# Patient Record
Sex: FEMALE | Race: OTHER | NOT HISPANIC OR LATINO | URBAN - METROPOLITAN AREA
[De-identification: names, ages, dates, MRNs, and addresses within clinical notes are randomized per-mention and may not be internally consistent; named-entity substitution may affect disease eponyms.]

---

## 2017-02-17 ENCOUNTER — INPATIENT (INPATIENT)
Facility: HOSPITAL | Age: 57
LOS: 1 days | Discharge: HOME | End: 2017-02-19
Attending: INTERNAL MEDICINE | Admitting: INTERNAL MEDICINE
Payer: COMMERCIAL

## 2017-02-17 VITALS
RESPIRATION RATE: 16 BRPM | HEART RATE: 111 BPM | HEIGHT: 59 IN | WEIGHT: 145.95 LBS | DIASTOLIC BLOOD PRESSURE: 109 MMHG | TEMPERATURE: 99 F | SYSTOLIC BLOOD PRESSURE: 171 MMHG

## 2017-02-17 DIAGNOSIS — I24.9 ACUTE ISCHEMIC HEART DISEASE, UNSPECIFIED: ICD-10-CM

## 2017-02-17 DIAGNOSIS — R07.9 CHEST PAIN, UNSPECIFIED: ICD-10-CM

## 2017-02-17 LAB
ALBUMIN SERPL ELPH-MCNC: 3.9 G/DL — SIGNIFICANT CHANGE UP (ref 3.3–5)
ALP SERPL-CCNC: 81 U/L — SIGNIFICANT CHANGE UP (ref 40–120)
ALT FLD-CCNC: 20 U/L — SIGNIFICANT CHANGE UP (ref 4–33)
APPEARANCE UR: CLEAR — SIGNIFICANT CHANGE UP
APTT BLD: 28.2 SEC — SIGNIFICANT CHANGE UP (ref 27.5–37.4)
AST SERPL-CCNC: 23 U/L — SIGNIFICANT CHANGE UP (ref 4–32)
BACTERIA # UR AUTO: SIGNIFICANT CHANGE UP
BASOPHILS # BLD AUTO: 0.03 K/UL — SIGNIFICANT CHANGE UP (ref 0–0.2)
BASOPHILS NFR BLD AUTO: 0.2 % — SIGNIFICANT CHANGE UP (ref 0–2)
BILIRUB SERPL-MCNC: 0.3 MG/DL — SIGNIFICANT CHANGE UP (ref 0.2–1.2)
BILIRUB UR-MCNC: NEGATIVE — SIGNIFICANT CHANGE UP
BLOOD UR QL VISUAL: NEGATIVE — SIGNIFICANT CHANGE UP
BUN SERPL-MCNC: 11 MG/DL — SIGNIFICANT CHANGE UP (ref 7–23)
CALCIUM SERPL-MCNC: 9 MG/DL — SIGNIFICANT CHANGE UP (ref 8.4–10.5)
CHLORIDE SERPL-SCNC: 106 MMOL/L — SIGNIFICANT CHANGE UP (ref 98–107)
CK SERPL-CCNC: 102 U/L — SIGNIFICANT CHANGE UP (ref 25–170)
CK SERPL-CCNC: 114 U/L — SIGNIFICANT CHANGE UP (ref 25–170)
CO2 SERPL-SCNC: 24 MMOL/L — SIGNIFICANT CHANGE UP (ref 22–31)
COLOR SPEC: SIGNIFICANT CHANGE UP
CREAT SERPL-MCNC: 0.65 MG/DL — SIGNIFICANT CHANGE UP (ref 0.5–1.3)
EOSINOPHIL # BLD AUTO: 0.12 K/UL — SIGNIFICANT CHANGE UP (ref 0–0.5)
EOSINOPHIL NFR BLD AUTO: 1 % — SIGNIFICANT CHANGE UP (ref 0–6)
GLUCOSE SERPL-MCNC: 121 MG/DL — HIGH (ref 70–99)
GLUCOSE UR-MCNC: NEGATIVE — SIGNIFICANT CHANGE UP
HCT VFR BLD CALC: 35.9 % — SIGNIFICANT CHANGE UP (ref 34.5–45)
HGB BLD-MCNC: 11.4 G/DL — LOW (ref 11.5–15.5)
IMM GRANULOCYTES NFR BLD AUTO: 0.4 % — SIGNIFICANT CHANGE UP (ref 0–1.5)
INR BLD: 0.99 — SIGNIFICANT CHANGE UP (ref 0.87–1.18)
KETONES UR-MCNC: NEGATIVE — SIGNIFICANT CHANGE UP
LEUKOCYTE ESTERASE UR-ACNC: NEGATIVE — SIGNIFICANT CHANGE UP
LYMPHOCYTES # BLD AUTO: 1.67 K/UL — SIGNIFICANT CHANGE UP (ref 1–3.3)
LYMPHOCYTES # BLD AUTO: 13.5 % — SIGNIFICANT CHANGE UP (ref 13–44)
MCHC RBC-ENTMCNC: 26.8 PG — LOW (ref 27–34)
MCHC RBC-ENTMCNC: 31.8 % — LOW (ref 32–36)
MCV RBC AUTO: 84.5 FL — SIGNIFICANT CHANGE UP (ref 80–100)
MONOCYTES # BLD AUTO: 0.5 K/UL — SIGNIFICANT CHANGE UP (ref 0–0.9)
MONOCYTES NFR BLD AUTO: 4.1 % — SIGNIFICANT CHANGE UP (ref 2–14)
MUCOUS THREADS # UR AUTO: SIGNIFICANT CHANGE UP
NEUTROPHILS # BLD AUTO: 9.97 K/UL — HIGH (ref 1.8–7.4)
NEUTROPHILS NFR BLD AUTO: 80.8 % — HIGH (ref 43–77)
NITRITE UR-MCNC: NEGATIVE — SIGNIFICANT CHANGE UP
NON-SQ EPI CELLS # UR AUTO: <1 — SIGNIFICANT CHANGE UP
PH UR: 7 — SIGNIFICANT CHANGE UP (ref 4.6–8)
PLATELET # BLD AUTO: 281 K/UL — SIGNIFICANT CHANGE UP (ref 150–400)
PMV BLD: 9.4 FL — SIGNIFICANT CHANGE UP (ref 7–13)
POTASSIUM SERPL-MCNC: 3.9 MMOL/L — SIGNIFICANT CHANGE UP (ref 3.5–5.3)
POTASSIUM SERPL-SCNC: 3.9 MMOL/L — SIGNIFICANT CHANGE UP (ref 3.5–5.3)
PROT SERPL-MCNC: 7.1 G/DL — SIGNIFICANT CHANGE UP (ref 6–8.3)
PROT UR-MCNC: NEGATIVE — SIGNIFICANT CHANGE UP
PROTHROM AB SERPL-ACNC: 11.3 SEC — SIGNIFICANT CHANGE UP (ref 10–13.1)
RBC # BLD: 4.25 M/UL — SIGNIFICANT CHANGE UP (ref 3.8–5.2)
RBC # FLD: 14.6 % — HIGH (ref 10.3–14.5)
RBC CASTS # UR COMP ASSIST: SIGNIFICANT CHANGE UP (ref 0–?)
SODIUM SERPL-SCNC: 144 MMOL/L — SIGNIFICANT CHANGE UP (ref 135–145)
SP GR SPEC: 1.01 — SIGNIFICANT CHANGE UP (ref 1–1.03)
SQUAMOUS # UR AUTO: SIGNIFICANT CHANGE UP
TROPONIN T SERPL-MCNC: < 0.06 NG/ML — SIGNIFICANT CHANGE UP (ref 0–0.06)
TROPONIN T SERPL-MCNC: < 0.06 NG/ML — SIGNIFICANT CHANGE UP (ref 0–0.06)
UROBILINOGEN FLD QL: NORMAL E.U. — SIGNIFICANT CHANGE UP (ref 0.1–0.2)
WBC # BLD: 12.34 K/UL — HIGH (ref 3.8–10.5)
WBC # FLD AUTO: 12.34 K/UL — HIGH (ref 3.8–10.5)
WBC UR QL: SIGNIFICANT CHANGE UP (ref 0–?)

## 2017-02-17 PROCEDURE — 71020: CPT | Mod: 26

## 2017-02-17 RX ORDER — ASPIRIN/CALCIUM CARB/MAGNESIUM 324 MG
1 TABLET ORAL
Qty: 0 | Refills: 0 | COMMUNITY

## 2017-02-17 RX ORDER — SIMETHICONE 80 MG/1
80 TABLET, CHEWABLE ORAL
Qty: 0 | Refills: 0 | Status: DISCONTINUED | OUTPATIENT
Start: 2017-02-17 | End: 2017-02-19

## 2017-02-17 RX ORDER — ENOXAPARIN SODIUM 100 MG/ML
40 INJECTION SUBCUTANEOUS EVERY 24 HOURS
Qty: 0 | Refills: 0 | Status: DISCONTINUED | OUTPATIENT
Start: 2017-02-17 | End: 2017-02-19

## 2017-02-17 RX ORDER — ASCORBIC ACID 60 MG
1 TABLET,CHEWABLE ORAL
Qty: 0 | Refills: 0 | COMMUNITY

## 2017-02-17 RX ORDER — ASPIRIN/CALCIUM CARB/MAGNESIUM 324 MG
162 TABLET ORAL ONCE
Qty: 0 | Refills: 0 | Status: COMPLETED | OUTPATIENT
Start: 2017-02-17 | End: 2017-02-17

## 2017-02-17 RX ORDER — ASPIRIN/CALCIUM CARB/MAGNESIUM 324 MG
81 TABLET ORAL DAILY
Qty: 0 | Refills: 0 | Status: DISCONTINUED | OUTPATIENT
Start: 2017-02-18 | End: 2017-02-19

## 2017-02-17 RX ORDER — INFLUENZA VIRUS VACCINE 15; 15; 15; 15 UG/.5ML; UG/.5ML; UG/.5ML; UG/.5ML
0.5 SUSPENSION INTRAMUSCULAR ONCE
Qty: 0 | Refills: 0 | Status: COMPLETED | OUTPATIENT
Start: 2017-02-17 | End: 2017-02-19

## 2017-02-17 RX ORDER — NITROGLYCERIN 6.5 MG
0.4 CAPSULE, EXTENDED RELEASE ORAL ONCE
Qty: 0 | Refills: 0 | Status: COMPLETED | OUTPATIENT
Start: 2017-02-17 | End: 2017-02-17

## 2017-02-17 RX ADMIN — Medication 0.4 MILLIGRAM(S): at 11:24

## 2017-02-17 RX ADMIN — ENOXAPARIN SODIUM 40 MILLIGRAM(S): 100 INJECTION SUBCUTANEOUS at 22:23

## 2017-02-17 RX ADMIN — Medication 162 MILLIGRAM(S): at 11:24

## 2017-02-17 NOTE — H&P ADULT. - MUSCULOSKELETAL
details… detailed exam no calf tenderness/normal/no joint erythema/ROM intact/no joint swelling/no joint warmth

## 2017-02-17 NOTE — H&P ADULT. - RS GEN PE MLT RESP DETAILS PC
normal/no wheezes/no subcutaneous emphysema/breath sounds equal/clear to auscultation bilaterally/respirations non-labored/no chest wall tenderness/no intercostal retractions/no rales/airway patent/good air movement/no rhonchi

## 2017-02-17 NOTE — H&P ADULT. - NEGATIVE NEUROLOGICAL SYMPTOMS
no headache/no hemiparesis/no paresthesias/no loss of consciousness/no vertigo/no transient paralysis/no syncope/no confusion/no tremors/no facial palsy/no generalized seizures/no loss of sensation/no weakness/no difficulty walking/no focal seizures

## 2017-02-17 NOTE — ED ADULT TRIAGE NOTE - CHIEF COMPLAINT QUOTE
c/o intermittent chest pain , dizziness, nausea, lightheadedness x 3 weeks with frequent belching. Pt also states started cough/cold symptoms x 1 week. States last night felt shooting pain from left upper chest into left arm and left neck area which resolved but woke up this morning with mid sternal chest tightness. Denies any  PMH

## 2017-02-17 NOTE — H&P ADULT. - NEUROLOGICAL DETAILS
normal strength/alert and oriented x 3/sensation intact/no spontaneous movement/responds to pain/responds to verbal commands/cranial nerves intact

## 2017-02-17 NOTE — H&P ADULT. - HISTORY OF PRESENT ILLNESS
56 y/o female with no known PMH presents with substernal chest pain x 3 weeks that suddenly became severe last night.  As per pt, chest pain is characterized as pressure, intermittent in nature, severity of 8/10, worse with exertion, radiating to left arm, neck and upper back, associated with nausea, lightheadedness, and SOB, relieved after a few minutes of rest. Pt reports episode of coughing last night that preceded chest pain.  Pt has never seen a cardiologist and reports 1 block CRUZ, PND, and 2 pillow orthopnea.  Pt denies Fever, chills, headache, change in vision, focal weakness, numbness, tingling, palpitations, diaphoresis, vomiting, diarrhea, constipation, dysuria, hematurina, melena, hematochezia, swelling claudication. 56 y/o female with no known PMH presents with int. substernal chest pain x 3 weeks that suddenly became severe last night.  As per pt, chest pain is characterized as pressure, intermittent in nature, severity of 8/10, radiating to left arm, neck and upper back, and worse with exertion. Also associated with nausea, lightheadedness, and SOB, relieved after a few minutes of rest. Pt reports episode of coughing last night that preceded chest pain.  Pt has never seen a cardiologist and reports 1 block CRUZ, PND, and 2 pillow orthopnea.  Pt denies fever, chills, headache, change in vision, focal weakness, numbness, tingling, palpitations, diaphoresis, vomiting, diarrhea, constipation, dysuria, hematuria, melena, hematochezia, swelling claudication.

## 2017-02-17 NOTE — ED PROVIDER NOTE - MEDICAL DECISION MAKING DETAILS
57 y/o F with no known medical history presents with chest pain that is worsened with exertion and associated with CRUZ. Concern for ACS. EKG with no ischemic changes. Heart score 2. Wells negative. Gilda, CXR 57 yo woman with no significant past medical history now presenting with one week of intermittent chest tightness, initially occuring infrequently and while active, now occuring more often and now preventing her from activity.  Last night was more severe and radiated to left shoulder and neck.  No palpitations, lightheadedness.  No cough, fever, chills.  Some nausea with the pain, no vomiting.  No diaphoresis.  Exam here with initial hypertension.  Afebrile.  Pink conj, anicteric.  Neck supple no jvd or bruit.  Lungs clear  Heart without murmur or rub.  Abdomen soft and nontender throughout.  Extremities without calf pain or swelling.  No edema.  EKG now without acute changes. Clinically with described onset and progression of symptoms of enough to concern to warrant serial testing, plan for echo, stress....will follow to admission with Dr. Horner.....Yakelin  57 y/o F with no known medical history presents with chest pain that is worsened with exertion and associated with CRUZ. Concern for ACS. EKG with no ischemic changes. Heart score 2. Wells negative. Gilda, CXR

## 2017-02-17 NOTE — ED PROVIDER NOTE - OBJECTIVE STATEMENT
58 y/o F with no known medical history presents with intermittent chest pain x 3 weeks that worsened last night. Pain is substernal, "pulling", 8/10 and non radiating. Last night patient had episode of pain on the left side of the chest that radiated to left arm and left neck which began after coughing. She has been noticing increase in chest pain with exertion and worsening CRUZ. Denies orthopnea, PND, LE swelling. Pain is not pleuritic in nature. Denies family history of MI.

## 2017-02-17 NOTE — H&P ADULT. - NEGATIVE MUSCULOSKELETAL SYMPTOMS
no arthralgia/no muscle weakness/no stiffness/no joint swelling/no neck pain/no muscle cramps/no arthritis/no myalgia

## 2017-02-17 NOTE — H&P ADULT. - PROBLEM SELECTOR PLAN 1
Admit to telemetry, repeat CE, Serial EKG, CBC, CMP, TSH, FLP  ASA, stress, echo  F/u Cardiologist note Admit to telemetry, repeat CE, Serial EKG, TSH, FLP  continue ASA, check nuclear stress test and echo  F/u Cardiologist note

## 2017-02-17 NOTE — ED PROVIDER NOTE - ATTENDING CONTRIBUTION TO CARE
Evaluated, examined and discussed at length with Dr. Horner and agree with continued evaluation and management plans.....Yakelin

## 2017-02-17 NOTE — ED ADULT NURSE NOTE - OBJECTIVE STATEMENT
Pt AOX3 c/o sharp chest pain last night radiating to the left arm and neck which resolved, on going mild CRUZ and  chest discomfort, dizziness and nausea which started this morning. "It feels like gas." NSR on CM. Pt denies vomiting, leg swelling or current SOB. Resp even and unlabored. Pt AOX3 c/o sharp chest pain last night radiating to the left arm and neck after coughing which resolved, additional c/o on going mild CRUZ and chest discomfort, dizziness and nausea which started this morning. "It feels like gas." NSR on CM. Pt denies vomiting, leg swelling or current SOB. Resp even and unlabored.

## 2017-02-17 NOTE — H&P ADULT. - ASSESSMENT
56 y/o female with no known PMH presents with substernal chest pain x 3 weeks that suddenly became severe last night, r/o ACS 58 y/o female with no known PMH presents with int substernal chest pain x 3 weeks that suddenly became severe last night, r/o ACS

## 2017-02-18 LAB
ALBUMIN SERPL ELPH-MCNC: 3.7 G/DL — SIGNIFICANT CHANGE UP (ref 3.3–5)
ALP SERPL-CCNC: 76 U/L — SIGNIFICANT CHANGE UP (ref 40–120)
ALT FLD-CCNC: 18 U/L — SIGNIFICANT CHANGE UP (ref 4–33)
AST SERPL-CCNC: 19 U/L — SIGNIFICANT CHANGE UP (ref 4–32)
BASOPHILS # BLD AUTO: 0.03 K/UL — SIGNIFICANT CHANGE UP (ref 0–0.2)
BASOPHILS NFR BLD AUTO: 0.4 % — SIGNIFICANT CHANGE UP (ref 0–2)
BILIRUB SERPL-MCNC: 0.5 MG/DL — SIGNIFICANT CHANGE UP (ref 0.2–1.2)
BUN SERPL-MCNC: 9 MG/DL — SIGNIFICANT CHANGE UP (ref 7–23)
CALCIUM SERPL-MCNC: 8.7 MG/DL — SIGNIFICANT CHANGE UP (ref 8.4–10.5)
CHLORIDE SERPL-SCNC: 103 MMOL/L — SIGNIFICANT CHANGE UP (ref 98–107)
CHOLEST SERPL-MCNC: 198 MG/DL — SIGNIFICANT CHANGE UP (ref 120–199)
CK SERPL-CCNC: 70 U/L — SIGNIFICANT CHANGE UP (ref 25–170)
CO2 SERPL-SCNC: 26 MMOL/L — SIGNIFICANT CHANGE UP (ref 22–31)
CREAT SERPL-MCNC: 0.59 MG/DL — SIGNIFICANT CHANGE UP (ref 0.5–1.3)
EOSINOPHIL # BLD AUTO: 0.18 K/UL — SIGNIFICANT CHANGE UP (ref 0–0.5)
EOSINOPHIL NFR BLD AUTO: 2.2 % — SIGNIFICANT CHANGE UP (ref 0–6)
GLUCOSE SERPL-MCNC: 91 MG/DL — SIGNIFICANT CHANGE UP (ref 70–99)
HBA1C BLD-MCNC: 5.7 % — HIGH (ref 4–5.6)
HCT VFR BLD CALC: 36.1 % — SIGNIFICANT CHANGE UP (ref 34.5–45)
HDLC SERPL-MCNC: 53 MG/DL — SIGNIFICANT CHANGE UP (ref 45–65)
HGB BLD-MCNC: 11.8 G/DL — SIGNIFICANT CHANGE UP (ref 11.5–15.5)
IMM GRANULOCYTES NFR BLD AUTO: 0.2 % — SIGNIFICANT CHANGE UP (ref 0–1.5)
LIPID PNL WITH DIRECT LDL SERPL: 132 MG/DL — SIGNIFICANT CHANGE UP
LYMPHOCYTES # BLD AUTO: 2.03 K/UL — SIGNIFICANT CHANGE UP (ref 1–3.3)
LYMPHOCYTES # BLD AUTO: 25.1 % — SIGNIFICANT CHANGE UP (ref 13–44)
MAGNESIUM SERPL-MCNC: 2 MG/DL — SIGNIFICANT CHANGE UP (ref 1.6–2.6)
MCHC RBC-ENTMCNC: 27.8 PG — SIGNIFICANT CHANGE UP (ref 27–34)
MCHC RBC-ENTMCNC: 32.7 % — SIGNIFICANT CHANGE UP (ref 32–36)
MCV RBC AUTO: 84.9 FL — SIGNIFICANT CHANGE UP (ref 80–100)
MONOCYTES # BLD AUTO: 0.49 K/UL — SIGNIFICANT CHANGE UP (ref 0–0.9)
MONOCYTES NFR BLD AUTO: 6.1 % — SIGNIFICANT CHANGE UP (ref 2–14)
NEUTROPHILS # BLD AUTO: 5.33 K/UL — SIGNIFICANT CHANGE UP (ref 1.8–7.4)
NEUTROPHILS NFR BLD AUTO: 66 % — SIGNIFICANT CHANGE UP (ref 43–77)
PHOSPHATE SERPL-MCNC: 3.3 MG/DL — SIGNIFICANT CHANGE UP (ref 2.5–4.5)
PLATELET # BLD AUTO: 303 K/UL — SIGNIFICANT CHANGE UP (ref 150–400)
PMV BLD: 10.4 FL — SIGNIFICANT CHANGE UP (ref 7–13)
POTASSIUM SERPL-MCNC: 4 MMOL/L — SIGNIFICANT CHANGE UP (ref 3.5–5.3)
POTASSIUM SERPL-SCNC: 4 MMOL/L — SIGNIFICANT CHANGE UP (ref 3.5–5.3)
PROT SERPL-MCNC: 6.9 G/DL — SIGNIFICANT CHANGE UP (ref 6–8.3)
RBC # BLD: 4.25 M/UL — SIGNIFICANT CHANGE UP (ref 3.8–5.2)
RBC # FLD: 14.9 % — HIGH (ref 10.3–14.5)
SODIUM SERPL-SCNC: 142 MMOL/L — SIGNIFICANT CHANGE UP (ref 135–145)
TRIGL SERPL-MCNC: 137 MG/DL — SIGNIFICANT CHANGE UP (ref 10–149)
TROPONIN T SERPL-MCNC: < 0.06 NG/ML — SIGNIFICANT CHANGE UP (ref 0–0.06)
TSH SERPL-MCNC: 0.95 UIU/ML — SIGNIFICANT CHANGE UP (ref 0.27–4.2)
WBC # BLD: 8.08 K/UL — SIGNIFICANT CHANGE UP (ref 3.8–10.5)
WBC # FLD AUTO: 8.08 K/UL — SIGNIFICANT CHANGE UP (ref 3.8–10.5)

## 2017-02-18 RX ORDER — ACETAMINOPHEN 500 MG
650 TABLET ORAL EVERY 6 HOURS
Qty: 0 | Refills: 0 | Status: DISCONTINUED | OUTPATIENT
Start: 2017-02-18 | End: 2017-02-19

## 2017-02-18 RX ORDER — PANTOPRAZOLE SODIUM 20 MG/1
40 TABLET, DELAYED RELEASE ORAL
Qty: 0 | Refills: 0 | Status: DISCONTINUED | OUTPATIENT
Start: 2017-02-18 | End: 2017-02-19

## 2017-02-18 RX ADMIN — Medication 81 MILLIGRAM(S): at 11:58

## 2017-02-18 RX ADMIN — Medication 650 MILLIGRAM(S): at 06:53

## 2017-02-18 RX ADMIN — PANTOPRAZOLE SODIUM 40 MILLIGRAM(S): 20 TABLET, DELAYED RELEASE ORAL at 18:27

## 2017-02-18 RX ADMIN — ENOXAPARIN SODIUM 40 MILLIGRAM(S): 100 INJECTION SUBCUTANEOUS at 21:25

## 2017-02-18 RX ADMIN — Medication 650 MILLIGRAM(S): at 12:12

## 2017-02-19 VITALS — WEIGHT: 145.95 LBS

## 2017-02-19 PROCEDURE — 78452 HT MUSCLE IMAGE SPECT MULT: CPT | Mod: 26

## 2017-02-19 PROCEDURE — 93018 CV STRESS TEST I&R ONLY: CPT | Mod: GC

## 2017-02-19 PROCEDURE — 93016 CV STRESS TEST SUPVJ ONLY: CPT | Mod: GC

## 2017-02-19 RX ORDER — SIMETHICONE 80 MG/1
1 TABLET, CHEWABLE ORAL
Qty: 0 | Refills: 0 | COMMUNITY
Start: 2017-02-19

## 2017-02-19 RX ORDER — PANTOPRAZOLE SODIUM 20 MG/1
1 TABLET, DELAYED RELEASE ORAL
Qty: 14 | Refills: 0 | OUTPATIENT
Start: 2017-02-19 | End: 2017-03-05

## 2017-02-19 RX ADMIN — Medication 81 MILLIGRAM(S): at 13:23

## 2017-02-19 RX ADMIN — INFLUENZA VIRUS VACCINE 0.5 MILLILITER(S): 15; 15; 15; 15 SUSPENSION INTRAMUSCULAR at 15:06

## 2017-02-19 RX ADMIN — PANTOPRAZOLE SODIUM 40 MILLIGRAM(S): 20 TABLET, DELAYED RELEASE ORAL at 05:26

## 2017-02-19 NOTE — DISCHARGE NOTE ADULT - CARE PLAN
Principal Discharge DX:	Chest pain  Goal:	Continue current medications. Prevent recurrent pain.  Instructions for follow-up, activity and diet:	Follow up with your Cardiologist for further monitoring in 1-2 weeks. Please call to arrange appointment.  Secondary Diagnosis:	Pre-diabetes  Goal:	Goal HgbA1C <5.7%. Your HgbA1C is 5.9%. Continue diet modification to prevent progression to diabetes mellitus.  Instructions for follow-up, activity and diet:	Follow up with your primary care physician for further monitoring in 1-2 weeks. Please call to arrange appointment. Continue diet modification. Avoid complex carbohydrates such as bread, pasta, cereal, white rice, white potatoes, etc. Avoid concentrated sugar as found in desserts, candy, soda, juice, etc. Consume a diet based on lean protein (chicken, fish) and vegetables. Principal Discharge DX:	Chest pain  Goal:	Continue current medications. Prevent recurrent pain. Continue Protonix daily for symptoms of acid reflux. Use Simethicone as needed for gas pain. Follow up with your doctor regarding continuation of these medications.  Instructions for follow-up, activity and diet:	Follow up with your Cardiologist and primary care physician for further monitoring in 1-2 weeks. Please call to arrange appointment.  Secondary Diagnosis:	Pre-diabetes  Goal:	Goal HgbA1C <5.7%. Your HgbA1C is 5.9%. Continue diet modification to prevent progression to diabetes mellitus.  Instructions for follow-up, activity and diet:	Follow up with your primary care physician for further monitoring in 1-2 weeks. Please call to arrange appointment. Continue diet modification. Avoid complex carbohydrates such as bread, pasta, cereal, white rice, white potatoes, etc. Avoid concentrated sugar as found in desserts, candy, soda, juice, etc. Consume a diet based on lean protein (chicken, fish) and vegetables.

## 2017-02-19 NOTE — DIETITIAN INITIAL EVALUATION ADULT. - PROBLEM SELECTOR PLAN 1
Admit to telemetry, repeat CE, Serial EKG, TSH, FLP  continue ASA, check nuclear stress test and echo  F/u Cardiologist note

## 2017-02-19 NOTE — DISCHARGE NOTE ADULT - HOSPITAL COURSE
58 y/o female with no known PMH presents with int. substernal chest pain x 3 weeks that suddenly became severe last night.  As per pt, chest pain is characterized as pressure, intermittent in nature, severity of 8/10, radiating to left arm, neck and upper back, and worse with exertion. Also associated with nausea, lightheadedness, and SOB, relieved after a few minutes of rest. Pt reports episode of coughing last night that preceded chest pain.  Pt has never seen a cardiologist and reports 1 block CRUZ, PND, and 2 pillow orthopnea.     On admission: EKG: NSR @ 100bpm. ACS ruled out by negative cardiac enzymes. WBC: 12.3. CXR: Clear lungs. No pleural effusions or pneumothorax. UA negative. Nuclear stress test revealed ***********incomplete 56 y/o female with no known PMH presents with int. substernal chest pain x 3 weeks that suddenly became severe last night.  As per pt, chest pain is characterized as pressure, intermittent in nature, severity of 8/10, radiating to left arm, neck and upper back, and worse with exertion. Also associated with nausea, lightheadedness, and SOB, relieved after a few minutes of rest. Pt reports episode of coughing last night that preceded chest pain.  Pt has never seen a cardiologist and reports 1 block CRUZ, PND, and 2 pillow orthopnea.     On admission: EKG: NSR @ 100bpm. ACS ruled out by negative cardiac enzymes. WBC: 12.3. CXR: Clear lungs. No pleural effusions or pneumothorax. UA negative. Nuclear stress test revealed a normal study. The study is of good technical quality. The left ventricle was small in size. Normal myocardial perfusion scan, with no evidence of infarction or inducible ischemia. Post-stress gated wall motion analysis was performed (LVEF > 70%;LVEDV = 49 ml.), revealing normal LV function.    Case discussed with Dr. Pichardo, Pt medically cleared for discharge to home with follow up noted above. 58 y/o female with no known PMH presents with int. substernal chest pain x 3 weeks that suddenly became severe last night.  As per pt, chest pain is characterized as pressure, intermittent in nature, severity of 8/10, radiating to left arm, neck and upper back, and worse with exertion. Also associated with nausea, lightheadedness, and SOB, relieved after a few minutes of rest. Pt reports episode of coughing last night that preceded chest pain.  Pt has never seen a cardiologist and reports 1 block CRUZ, PND, and 2 pillow orthopnea.     On admission: EKG: NSR @ 100bpm. ACS ruled out by negative cardiac enzymes. WBC: 12.3. CXR: Clear lungs. No pleural effusions or pneumothorax. UA negative. Nuclear stress test revealed a normal study. The study is of good technical quality. The left ventricle was small in size. Normal myocardial perfusion scan, with no evidence of infarction or inducible ischemia. Post-stress gated wall motion analysis was performed (LVEF > 70%;LVEDV = 49 ml.), revealing normal LV function. Medicine consult Dr. Shankar, recommended to start PPI.     Case discussed with Dr. Pichardo, Pt medically cleared for discharge to home with follow up noted above.

## 2017-02-19 NOTE — DIETITIAN INITIAL EVALUATION ADULT. - OTHER INFO
Nutrition consult requested for BMI of greater that 40. Pt's is 29.5. Pt states that and her appetite has been good and she has been eating well. Pt admits to using salt at home but states that it is not "too much". Instructed Pt on low sodium, low fat/cholesterol diet. Pt verbalized good understanding. Pt had no complaints of GI distress nor of difficulty chewing or swallowing. Suggested Pt follow up with PCP since Hgb A1c was in pre-diabetic range.

## 2017-02-19 NOTE — DISCHARGE NOTE ADULT - CARE PROVIDER_API CALL
Fran Pichardo), Cardiovascular Disease; Internal Medicine; Nuclear Cardiology  [address] [place] [address]  Phone: [phone]  Fax: [phone]

## 2017-02-19 NOTE — DISCHARGE NOTE ADULT - PATIENT PORTAL LINK FT
“You can access the FollowHealth Patient Portal, offered by [facility], by registering with the following website: [email or web address]”

## 2017-02-19 NOTE — DISCHARGE NOTE ADULT - PLAN OF CARE
Follow up with your primary care physician for further monitoring in 1-2 weeks. Please call to arrange appointment. Continue diet modification. Avoid complex carbohydrates such as bread, pasta, cereal, white rice, white potatoes, etc. Avoid concentrated sugar as found in desserts, candy, soda, juice, etc. Consume a diet based on lean protein (chicken, fish) and vegetables. Goal HgbA1C <5.7%. Your HgbA1C is 5.9%. Continue diet modification to prevent progression to diabetes mellitus. Follow up with your Cardiologist for further monitoring in 1-2 weeks. Please call to arrange appointment. Continue current medications. Prevent recurrent pain. Follow up with your Cardiologist and primary care physician for further monitoring in 1-2 weeks. Please call to arrange appointment. Continue current medications. Prevent recurrent pain. Continue Protonix daily for symptoms of acid reflux. Use Simethicone as needed for gas pain. Follow up with your doctor regarding continuation of these medications.

## 2017-06-25 NOTE — H&P ADULT. - NEGATIVE ENMT SYMPTOMS
Pt went into bathroom and NGT fell out. Pt complaining of swelling of lips and tingling, pt receiving K+ and mag sulfate, iv fluids of LR w/ milliequivalent  potassium additive Pt currently on precedex gtt at 0.3, HR 54 BP 86/47 MAP 63 Pt currently on propofol @ 15mcg/hr HR-45 BP- 103/51 MAP 72 Intubated @ FIO2 40% Pt. c/o chest pain patient's blood pressure 90/58 pt had elevated temp of 100.9 no ear pain/no throat pain/no sinus symptoms/no tinnitus/no nasal congestion/no hearing difficulty/no dysphagia

## 2018-06-22 NOTE — ED PROVIDER NOTE - PRIOR EKG STATUS
Airway  Date/Time: 6/22/2018 12:18 PM  Urgency: elective    Airway not difficult    General Information and Staff    Patient location during procedure: OR  Anesthesiologist: CLAUDY CHEATHAM  CRNA: AURY PISANO  Performed: CRNA     Indications and Patient Condition  Indications for airway management: anesthesia  Spontaneous ventilation: present  Sedation level: deep  Preoxygenated: yes  Patient position: sniffing  MILS maintained throughout  Mask difficulty assessment: 1 - vent by mask    Final Airway Details  Final airway type: endotracheal airway      Successful airway: ETT  Cuffed: yes   Successful intubation technique: direct laryngoscopy  Facilitating devices/methods: stylet  Endotracheal tube insertion site: oral  Blade: De Jesus  Blade size: #2  ETT size: 8.0 mm  Cormack-Lehane Classification: grade I - full view of glottis  Placement verified by: chest auscultation and capnometry   Cuff volume (mL): 8  Measured from: lips  ETT to lips (cm): 23  Number of attempts at approach: 1    Additional Comments  Oral structures unchanged  Head neutral midline position throughout          
the EKG is unchanged from prior EKG

## 2019-12-11 NOTE — ED PROVIDER NOTE - ST/T WAVE
Lab Facility: [id number] To Bill?: No Detail Level: Detailed Billing Type: Third-Party Bill Lab: [id number] no ischemic changes

## 2020-11-03 NOTE — H&P ADULT. - NS NEC GEN PE MLT EXAM PC
ILD work up please, next available.e   All prior CT chest to be pushed into our system.      detailed exam

## 2022-03-09 ENCOUNTER — EMERGENCY (EMERGENCY)
Facility: HOSPITAL | Age: 62
LOS: 1 days | Discharge: HOME | End: 2022-03-09
Attending: EMERGENCY MEDICINE | Admitting: EMERGENCY MEDICINE
Payer: COMMERCIAL

## 2022-03-09 VITALS
RESPIRATION RATE: 18 BRPM | HEART RATE: 81 BPM | DIASTOLIC BLOOD PRESSURE: 83 MMHG | SYSTOLIC BLOOD PRESSURE: 135 MMHG | OXYGEN SATURATION: 100 % | TEMPERATURE: 98 F

## 2022-03-09 VITALS
OXYGEN SATURATION: 100 % | HEIGHT: 59 IN | HEART RATE: 117 BPM | RESPIRATION RATE: 16 BRPM | DIASTOLIC BLOOD PRESSURE: 95 MMHG | SYSTOLIC BLOOD PRESSURE: 154 MMHG | TEMPERATURE: 99 F

## 2022-03-09 PROCEDURE — 99285 EMERGENCY DEPT VISIT HI MDM: CPT | Mod: 25

## 2022-03-09 PROCEDURE — 93010 ELECTROCARDIOGRAM REPORT: CPT

## 2022-03-09 PROCEDURE — 71046 X-RAY EXAM CHEST 2 VIEWS: CPT | Mod: 26

## 2022-03-09 RX ORDER — FAMOTIDINE 10 MG/ML
20 INJECTION INTRAVENOUS ONCE
Refills: 0 | Status: COMPLETED | OUTPATIENT
Start: 2022-03-09 | End: 2022-03-09

## 2022-03-09 RX ADMIN — Medication 30 MILLILITER(S): at 22:02

## 2022-03-09 RX ADMIN — FAMOTIDINE 20 MILLIGRAM(S): 10 INJECTION INTRAVENOUS at 22:02

## 2022-03-09 NOTE — ED PROVIDER NOTE - PATIENT PORTAL LINK FT
You can access the FollowMyHealth Patient Portal offered by [facility] by registering at the following website: [email or web address]. By joining [employer]’s FollowMyHealth portal, you will also be able to view your health information using other applications (apps) compatible with our system.

## 2022-03-09 NOTE — ED PROVIDER NOTE - PROGRESS NOTE DETAILS
Fernando Hoover PGY2: Pt was re-evaluated at bedside, VSS, feeling better overall. Results were discussed with patient as well as return precautions and follow up plan with PCP and/or specialist. Time was taken to answer any questions that the patient had before providing them with discharge paperwork.

## 2022-03-09 NOTE — ED ADULT TRIAGE NOTE - CHIEF COMPLAINT QUOTE
Pt c/o sharp midsternal chest pain radiating to left side, nausea, headache, palpitations x 2 weeks.  Pt states symptoms have progressively gotten worse. No complaints of  dizziness, vomiting  SOB, fever, chills verbalized..

## 2022-03-09 NOTE — ED ADULT NURSE NOTE - OBJECTIVE STATEMENT
Patient received with the complaints of chest pain and palpitations since 2 weeks and tonight it worse. As per patient he r pain usually happens when she is hungry . Patient denies pain/palpitations at this time. No distress noted. Patient placed on cardiac monitor. Medications given as ordered. Patient tolerated well Nursing care continues

## 2022-03-09 NOTE — ED PROVIDER NOTE - ATTENDING CONTRIBUTION TO CARE
The patient is a 61y Female who denies any past medical and surgery history of PTED Pt c/o 2 weeks of progressively worsening sharp midsternal chest pain radiating to left side associated with nausea, headache, palpitations.  Pt denies SOB fever chills cough sputum dizziness vomiting states symptoms have progressively gotten worse. No complaints of  dizziness, or vomiting.,     T(F): 98.6 HR: 117 BP: 154/95 RR: 16 SpO2: 100% (09 Mar 2022 20:31) (100% - 100%)Height (cm): 149.9 (03-09-22 @ 20:31)  PE: as described; my additions and exceptions are noted in the chart    DATA:  EKG: pending at time of evaluation  LAB: Pending at time of evaluation  IMPRESSION/RISK:  Dx= GERD   Consideration include Will treat aggressively for same while screening for HBDz pancreatitis and ACS  Plan  as above   if negative will d/c with symptomatic tx and appropriate followup

## 2022-03-09 NOTE — ED PROVIDER NOTE - SPECIALTY CARE
From: Judith Gerber  To: Holly Sofia MD  Sent: 5/5/2017 11:18 AM EDT  Subject: Medication Renewal Request    Original authorizing provider: MD Judith Armenta would like a refill of the following medications:  cyclobenzaprine (FLEXERIL) 10 mg tablet Holly Sofia MD]  benazepril (LOTENSIN) 20 mg tablet Holly Sofia MD]    Preferred pharmacy: [facility] [address] [place] [address] St: Cardiology

## 2022-03-09 NOTE — ED PROVIDER NOTE - NSFOLLOWUPCLINICS_GEN_ALL_ED_FT
[facility]  Cardiology  [address] [place] [address]  Phone: [phone]  Fax:     [facility]  Gastroenterology  [address] [place] [id number]  Phone: [phone]  Fax:

## 2022-03-09 NOTE — ED PROVIDER NOTE - CLINICAL SUMMARY MEDICAL DECISION MAKING FREE TEXT BOX
62 y/o F, denies PMH, presents to ED c/o intermittent, non-exertional, midsternal, burning chest pain radiating to L-side a/w nausea and HA x 2 weeks. Denies FHx of heart disease, palpitations, SOB, abd pain, urinary sx, vomiting, diarrhea, diaphoresis, weakness/numbness, LE edema.  VSS. Physical exam unremarkable. EKG is NSR w/o ischemic changes.   Likely GERD. Will r/o ACS.  Plan for basic labs, trops, lipase, and CXR. Reassess.

## 2022-03-09 NOTE — ED PROVIDER NOTE - OBJECTIVE STATEMENT
60 y/o F, denies PMH, presents to ED c/o intermittent, non-exertional, midsternal, burning chest pain radiating to L-side a/w nausea and HA x 2 weeks. 62 y/o F, denies PMH, presents to ED c/o intermittent, non-exertional, midsternal, burning chest pain radiating to L-side a/w nausea and HA x 2 weeks. Pt notes similar sx 5 yrs ago, where she was admitted, had negative cardiac w/u including negative stress/echo. Pt was sent home on Protonix at that time. Pt has not seen a cardiologist since then. Denies FHx of heart disease, palpitations, SOB, abd pain, urinary sx, vomiting, diarrhea, diaphoresis, weakness/numbness, LE edema, or any other symptoms at this time.

## 2022-03-09 NOTE — ED PROVIDER NOTE - CARDIAC, MLM
Normal rate, regular rhythm.  Heart sounds S1, S2.  No murmurs, rubs or gallops. No chest wall tenderness

## 2022-03-10 LAB
ALBUMIN SERPL ELPH-MCNC: 3.9 G/DL — SIGNIFICANT CHANGE UP (ref 3.3–5)
ALP SERPL-CCNC: 89 U/L — SIGNIFICANT CHANGE UP (ref 40–120)
ALT FLD-CCNC: 20 U/L — SIGNIFICANT CHANGE UP (ref 4–33)
ANION GAP SERPL CALC-SCNC: 10 MMOL/L — SIGNIFICANT CHANGE UP (ref 7–14)
AST SERPL-CCNC: 23 U/L — SIGNIFICANT CHANGE UP (ref 4–32)
BASOPHILS # BLD AUTO: 0.05 K/UL — SIGNIFICANT CHANGE UP (ref 0–0.2)
BASOPHILS NFR BLD AUTO: 0.5 % — SIGNIFICANT CHANGE UP (ref 0–2)
BILIRUB SERPL-MCNC: 0.2 MG/DL — SIGNIFICANT CHANGE UP (ref 0.2–1.2)
BUN SERPL-MCNC: 13 MG/DL — SIGNIFICANT CHANGE UP (ref 7–23)
CALCIUM SERPL-MCNC: 9.1 MG/DL — SIGNIFICANT CHANGE UP (ref 8.4–10.5)
CHLORIDE SERPL-SCNC: 106 MMOL/L — SIGNIFICANT CHANGE UP (ref 98–107)
CO2 SERPL-SCNC: 26 MMOL/L — SIGNIFICANT CHANGE UP (ref 22–31)
CREAT SERPL-MCNC: 0.63 MG/DL — SIGNIFICANT CHANGE UP (ref 0.5–1.3)
EGFR: 100 ML/MIN/1.73M2 — SIGNIFICANT CHANGE UP
EOSINOPHIL # BLD AUTO: 0.17 K/UL — SIGNIFICANT CHANGE UP (ref 0–0.5)
EOSINOPHIL NFR BLD AUTO: 1.8 % — SIGNIFICANT CHANGE UP (ref 0–6)
GLUCOSE SERPL-MCNC: 136 MG/DL — HIGH (ref 70–99)
HCT VFR BLD CALC: 34.2 % — LOW (ref 34.5–45)
HGB BLD-MCNC: 10.9 G/DL — LOW (ref 11.5–15.5)
IANC: 6.59 K/UL — SIGNIFICANT CHANGE UP (ref 1.5–8.5)
IMM GRANULOCYTES NFR BLD AUTO: 0.3 % — SIGNIFICANT CHANGE UP (ref 0–1.5)
LIDOCAIN IGE QN: 33 U/L — SIGNIFICANT CHANGE UP (ref 7–60)
LYMPHOCYTES # BLD AUTO: 1.91 K/UL — SIGNIFICANT CHANGE UP (ref 1–3.3)
LYMPHOCYTES # BLD AUTO: 20.3 % — SIGNIFICANT CHANGE UP (ref 13–44)
MCHC RBC-ENTMCNC: 27.3 PG — SIGNIFICANT CHANGE UP (ref 27–34)
MCHC RBC-ENTMCNC: 31.9 GM/DL — LOW (ref 32–36)
MCV RBC AUTO: 85.7 FL — SIGNIFICANT CHANGE UP (ref 80–100)
MONOCYTES # BLD AUTO: 0.64 K/UL — SIGNIFICANT CHANGE UP (ref 0–0.9)
MONOCYTES NFR BLD AUTO: 6.8 % — SIGNIFICANT CHANGE UP (ref 2–14)
NEUTROPHILS # BLD AUTO: 6.59 K/UL — SIGNIFICANT CHANGE UP (ref 1.8–7.4)
NEUTROPHILS NFR BLD AUTO: 70.3 % — SIGNIFICANT CHANGE UP (ref 43–77)
NRBC # BLD: 0 /100 WBCS — SIGNIFICANT CHANGE UP
NRBC # FLD: 0 K/UL — SIGNIFICANT CHANGE UP
PLATELET # BLD AUTO: 311 K/UL — SIGNIFICANT CHANGE UP (ref 150–400)
POTASSIUM SERPL-MCNC: 3.5 MMOL/L — SIGNIFICANT CHANGE UP (ref 3.5–5.3)
POTASSIUM SERPL-SCNC: 3.5 MMOL/L — SIGNIFICANT CHANGE UP (ref 3.5–5.3)
PROT SERPL-MCNC: 6.9 G/DL — SIGNIFICANT CHANGE UP (ref 6–8.3)
RBC # BLD: 3.99 M/UL — SIGNIFICANT CHANGE UP (ref 3.8–5.2)
RBC # FLD: 14.2 % — SIGNIFICANT CHANGE UP (ref 10.3–14.5)
SODIUM SERPL-SCNC: 142 MMOL/L — SIGNIFICANT CHANGE UP (ref 135–145)
TROPONIN T, HIGH SENSITIVITY RESULT: <6 NG/L — SIGNIFICANT CHANGE UP
WBC # BLD: 9.39 K/UL — SIGNIFICANT CHANGE UP (ref 3.8–10.5)
WBC # FLD AUTO: 9.39 K/UL — SIGNIFICANT CHANGE UP (ref 3.8–10.5)

## 2022-03-21 DIAGNOSIS — Z82.49 FAMILY HISTORY OF ISCHEMIC HEART DISEASE AND OTHER DISEASES OF THE CIRCULATORY SYSTEM: ICD-10-CM

## 2022-03-21 DIAGNOSIS — R06.00 DYSPNEA, UNSPECIFIED: ICD-10-CM

## 2022-03-21 DIAGNOSIS — I10 ESSENTIAL (PRIMARY) HYPERTENSION: ICD-10-CM

## 2022-03-21 DIAGNOSIS — R73.03 PREDIABETES.: ICD-10-CM

## 2022-03-21 DIAGNOSIS — R51.9 HEADACHE, UNSPECIFIED: ICD-10-CM

## 2022-03-21 DIAGNOSIS — R07.9 CHEST PAIN, UNSPECIFIED: ICD-10-CM

## 2022-03-21 DIAGNOSIS — Z78.9 OTHER SPECIFIED HEALTH STATUS: ICD-10-CM

## 2022-03-21 DIAGNOSIS — Z63.4 DISAPPEARANCE AND DEATH OF FAMILY MEMBER: ICD-10-CM

## 2022-03-21 PROBLEM — Z00.00 ENCOUNTER FOR PREVENTIVE HEALTH EXAMINATION: Status: ACTIVE | Noted: 2022-03-21

## 2022-03-21 RX ORDER — PANTOPRAZOLE 40 MG/1
40 TABLET, DELAYED RELEASE ORAL
Refills: 0 | Status: ACTIVE | COMMUNITY

## 2022-03-21 RX ORDER — MULTIVIT-MIN/IRON/FOLIC ACID/K 18-600-40
CAPSULE ORAL
Refills: 0 | Status: ACTIVE | COMMUNITY

## 2022-03-21 SDOH — SOCIAL STABILITY - SOCIAL INSECURITY: DISSAPEARANCE AND DEATH OF FAMILY MEMBER: Z63.4

## 2022-03-24 ENCOUNTER — APPOINTMENT (OUTPATIENT)
Dept: CARDIOLOGY | Facility: CLINIC | Age: 62
End: 2022-03-24
Payer: COMMERCIAL

## 2022-03-24 ENCOUNTER — NON-APPOINTMENT (OUTPATIENT)
Age: 62
End: 2022-03-24

## 2022-03-24 VITALS
WEIGHT: 156 LBS | SYSTOLIC BLOOD PRESSURE: 129 MMHG | HEIGHT: 59 IN | HEART RATE: 88 BPM | OXYGEN SATURATION: 98 % | BODY MASS INDEX: 31.45 KG/M2 | DIASTOLIC BLOOD PRESSURE: 82 MMHG

## 2022-03-24 DIAGNOSIS — E78.5 HYPERLIPIDEMIA, UNSPECIFIED: ICD-10-CM

## 2022-03-24 DIAGNOSIS — Z78.9 OTHER SPECIFIED HEALTH STATUS: ICD-10-CM

## 2022-03-24 DIAGNOSIS — R94.31 ABNORMAL ELECTROCARDIOGRAM [ECG] [EKG]: ICD-10-CM

## 2022-03-24 LAB
CHOLEST SERPL-MCNC: 201 MG/DL
CRP SERPL-MCNC: 4 MG/L
ESTIMATED AVERAGE GLUCOSE: 120 MG/DL
HBA1C MFR BLD HPLC: 5.8 %
HDLC SERPL-MCNC: 49 MG/DL
LDLC SERPL CALC-MCNC: 121 MG/DL
NONHDLC SERPL-MCNC: 152 MG/DL
TRIGL SERPL-MCNC: 158 MG/DL
TSH SERPL-ACNC: 0.81 UIU/ML

## 2022-03-24 PROCEDURE — 93000 ELECTROCARDIOGRAM COMPLETE: CPT

## 2022-03-24 PROCEDURE — 99204 OFFICE O/P NEW MOD 45 MIN: CPT

## 2022-03-24 RX ORDER — ASPIRIN 81 MG
81 TABLET, DELAYED RELEASE (ENTERIC COATED) ORAL
Refills: 0 | Status: DISCONTINUED | COMMUNITY
End: 2022-03-24

## 2022-03-24 RX ORDER — SIMETHICONE 80 MG/1
80 TABLET, CHEWABLE ORAL
Refills: 0 | Status: DISCONTINUED | COMMUNITY
End: 2022-03-24

## 2022-03-24 NOTE — HISTORY OF PRESENT ILLNESS
[FreeTextEntry1] : History of Present Illnes\par 61-year-old male with no significant past medical history recently presented to [facility] emergency department complaining of chest pain that was nonexertional, midsternal and burning in nature.  It radiates to the left side and was associated with nausea and headache for the last 2 weeks.  The patient had similar symptoms 5 years ago.  At that time she was admitted with a negative cardiac work-up.  Negative stress echo.  She was sent home on Protonix.\par \par The patients episode was provoked by dumplings after she developed high blood pressure and chest discomfort.  it was associated with head and neck discomfort.  When she eats the patient still experiences chest discomfort.  It is midepigastric in nature.  Also occurs when excited but not upon activity or lifting.  Not Relieved when she takes a PPI.  The patient does not exercise.  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  She has never had these episodes before.  In 2017 when to [facility] with chest pain during which time she was evaluated with negative workup.  \par \par Family history\par Denies history of premature coronary disease or sudden cardiac death\par \par Social history\par No alcohol use, tobacco use or illicit drug use\par \par Past medical history/surgical history\par GERD\par \par Review of systems \par 14 point review of systems otherwise unremarkable separate described above in history of present illness\par \par Physical examination\par No apparent distress, alert and oriented x3, appropriate affect\par JVD not elevated, supple, no lymphadenopathy \par Clear to auscultation bilaterally with no wheezing rhonchi crackles \par Regular rate and rhythm with no murmur rub or gallop \par Positive bowel sounds, soft, nontender, nondistended, mass and guarding \par No clubbing cyanosis or edema\par Moving all extremities spontaneously\par 2+ DP/PT pulses\par No focal deficits\par \par Assessment/Plan\par --The patient is a 61-year-old postmenopausal woman who is obese.  She does not exercise on a regular basis.  She is having atypical chest discomfort that occurs after she eats or is anxious in nature.  For further evaluation it is recommended that a coronary CTA with possible FFR and TTE be performed.  Indications and details for the procedure reviewed with the patient.  Benefits and risks were explained.\par --The patient is scheduled to be seen by a GI specialist next week.  Discussed with the patient what is GERD.  The associated signs and symptoms and lifestyle steps that can be taken to decrease symptoms was gone over.  Discussed dietary changes with the patient.\par --We will check lipid profile, TSH/free T4, CRP and hemoglobin A1c.  Indications and details for the blood work was explained to the patient.\par --Recent ER visits and laboratory work was gone over with the patient.\par --EKG was performed due to history of chest pain.\par --It was reviewed in detail with the patient the importance of following a heart healthy lifestyle. AHA/ACC guidelines stress importance of lifestyle modification to lower cardiovascular disease risk. This includes eating a heart healthy diet (fresh fruits/vegetables, whole grains, limit intake of sweets, sugar–sweetened beverages, red meat), regular aerobic exercise (30 minutes of moderate intensity exercise at least 4 times a week), maintenance of desirable body weight and avoidance of tobacco products.\par \par All questions and concerns of the patient were addressed.  Prior medical/hospital records were reviewed.\par \par Recommend follow-up 1 week after TTE/coronary CTA is performed.  Recommend that the studies to be done in the next few weeks.

## 2022-03-24 NOTE — REVIEW OF SYSTEMS
[Chest Discomfort] : chest discomfort [Abdominal Pain] : abdominal pain [Nausea] : nausea [Negative] : Heme/Lymph

## 2022-03-27 ENCOUNTER — NON-APPOINTMENT (OUTPATIENT)
Age: 62
End: 2022-03-27

## 2022-03-28 ENCOUNTER — NON-APPOINTMENT (OUTPATIENT)
Age: 62
End: 2022-03-28

## 2022-03-31 ENCOUNTER — APPOINTMENT (OUTPATIENT)
Dept: GASTROENTEROLOGY | Facility: CLINIC | Age: 62
End: 2022-03-31
Payer: COMMERCIAL

## 2022-03-31 VITALS
TEMPERATURE: 97.7 F | DIASTOLIC BLOOD PRESSURE: 75 MMHG | HEART RATE: 87 BPM | HEIGHT: 59 IN | BODY MASS INDEX: 31.25 KG/M2 | OXYGEN SATURATION: 95 % | SYSTOLIC BLOOD PRESSURE: 132 MMHG | WEIGHT: 155 LBS

## 2022-03-31 DIAGNOSIS — K21.9 GASTRO-ESOPHAGEAL REFLUX DISEASE W/OUT ESOPHAGITIS: ICD-10-CM

## 2022-03-31 PROCEDURE — 99204 OFFICE O/P NEW MOD 45 MIN: CPT

## 2022-03-31 NOTE — HISTORY OF PRESENT ILLNESS
[de-identified] : 61F, GERD on protonix, here after ED visit for nonexertional chest pain, nausea, headaches x 2 wks. Discharged home for outpatient follow up. Saw Cardiology earlier this month, coronary CTA and TTE are pending. Referred to GI for w/u of GI etiologies causing chest pain. \par \par The patient states that she was admitted once several years ago for chest pain, at that time told it could be GERD and has been on a PPI consistently since then. Denies NSAID use. \par \par Currently has symptoms of intermittent mid chest pain, worse after eating. Worse w/ spicy foods and dairy. Denies n/v, dysphagia, odynophagia, melena, hematochezia, weight loss. \par \par No prior EGD. Colonoscopy >10 yrs ago and normal per patient. \par No fhx of GI malignancy.

## 2022-03-31 NOTE — PHYSICAL EXAM
[General Appearance - Alert] : alert [General Appearance - In No Acute Distress] : in no acute distress [] : no respiratory distress [Exaggerated Use Of Accessory Muscles For Inspiration] : no accessory muscle use [Abdomen Soft] : soft [Abdomen Tenderness] : non-tender [Oriented To Time, Place, And Person] : oriented to person, place, and time [Impaired Insight] : insight and judgment were intact

## 2022-03-31 NOTE — ASSESSMENT
[FreeTextEntry1] : 61F, GERD on protonix, here after ED visit for nonexertional chest pain, nausea, headaches. Followed up w/ cardiology, pending coronary CTA and TTE are pending. Reporting intermittent chest discomfort, worse after eating and worse w spicy foods. Denies n/v, dysphagia, odynophagia, melena, hematochezia, weight loss. Diagnosed w/ GERD several years ago after admission for chest pain (reported negative w/u at the time), and has been compliant w PPI. No NSAIDS. No prior EGD. Colonoscopy >10 yrs ago and normal per patient. No fhx of GI malignancy. \par \par Unclear if current chest discomfort is 2/2 cardiac vs GI etiology (given hx of GERD). \par \par - Increase PPI to BID dosing \par - Await cardiac w/u. If negative and once complete, will schedule EGD to r/o PUD, gastritis. Will need H pylori biopsies at the time. \par - avoid nsaids \par - Will need colonoscopy at time of EGD for screening purposes\par - RTC after cardiology follow up

## 2022-04-06 ENCOUNTER — APPOINTMENT (OUTPATIENT)
Dept: GASTROENTEROLOGY | Facility: CLINIC | Age: 62
End: 2022-04-06

## 2022-04-13 ENCOUNTER — APPOINTMENT (OUTPATIENT)
Dept: CARDIOLOGY | Facility: CLINIC | Age: 62
End: 2022-04-13
Payer: COMMERCIAL

## 2022-04-13 PROCEDURE — 93306 TTE W/DOPPLER COMPLETE: CPT

## 2022-04-22 ENCOUNTER — APPOINTMENT (OUTPATIENT)
Dept: CT IMAGING | Facility: CLINIC | Age: 62
End: 2022-04-22
Payer: COMMERCIAL

## 2022-04-22 ENCOUNTER — OUTPATIENT (OUTPATIENT)
Dept: OUTPATIENT SERVICES | Facility: HOSPITAL | Age: 62
LOS: 1 days | End: 2022-04-22
Payer: COMMERCIAL

## 2022-04-22 DIAGNOSIS — R07.89 OTHER CHEST PAIN: ICD-10-CM

## 2022-04-22 PROCEDURE — 75574 CT ANGIO HRT W/3D IMAGE: CPT

## 2022-04-22 PROCEDURE — 75574 CT ANGIO HRT W/3D IMAGE: CPT | Mod: 26

## 2022-04-28 ENCOUNTER — APPOINTMENT (OUTPATIENT)
Dept: CARDIOLOGY | Facility: CLINIC | Age: 62
End: 2022-04-28
Payer: COMMERCIAL

## 2022-04-28 ENCOUNTER — NON-APPOINTMENT (OUTPATIENT)
Age: 62
End: 2022-04-28

## 2022-04-28 VITALS
WEIGHT: 155 LBS | OXYGEN SATURATION: 98 % | BODY MASS INDEX: 31.25 KG/M2 | SYSTOLIC BLOOD PRESSURE: 126 MMHG | HEART RATE: 70 BPM | HEIGHT: 59 IN | DIASTOLIC BLOOD PRESSURE: 78 MMHG

## 2022-04-28 DIAGNOSIS — E78.5 HYPERLIPIDEMIA, UNSPECIFIED: ICD-10-CM

## 2022-04-28 DIAGNOSIS — R07.89 OTHER CHEST PAIN: ICD-10-CM

## 2022-04-28 PROCEDURE — 93000 ELECTROCARDIOGRAM COMPLETE: CPT

## 2022-04-28 PROCEDURE — 99214 OFFICE O/P EST MOD 30 MIN: CPT

## 2022-04-28 RX ORDER — ATORVASTATIN CALCIUM 10 MG/1
10 TABLET, FILM COATED ORAL
Qty: 90 | Refills: 3 | Status: ACTIVE | COMMUNITY
Start: 2022-04-28

## 2022-06-04 PROBLEM — E78.5 DYSLIPIDEMIA: Status: ACTIVE | Noted: 2022-06-04

## 2022-06-04 PROBLEM — R07.89 CHEST DISCOMFORT: Status: ACTIVE | Noted: 2022-03-24

## 2022-06-04 NOTE — HISTORY OF PRESENT ILLNESS
[FreeTextEntry1] : History of Present Illness\par 61-year-old male with no significant past medical history recently presented to outside hospital emergency department complaining of chest pain that was nonexertional, midsternal and burning in nature.  It radiates to the left side and was associated with nausea and headache for the last 2 weeks.  The patient had similar symptoms 5 years ago.  At that time she was admitted with a negative cardiac work-up.  Negative stress echo.  She was sent home on Protonix.\par \par The patients episode was provoked by dumplings after she developed high blood pressure and chest discomfort.  it was associated with head and neck discomfort.  When she eats the patient still experiences chest discomfort.  It is midepigastric in nature.  Also occurs when excited but not upon activity or lifting.  Not Relieved when she takes a PPI.  The patient does not exercise.  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  She has never had these episodes before.  In 2017 when to [facility] with chest pain during which time she was evaluated with negative workup.\par \par =======\par 4/28/2022\par No cardiopulmonary complaints at rest or upon exertion.  She is walking without any symptoms.  Reports walking a lot at work and at home.  No blood in stool or urine.   Patient is taking all of her medications as prescribed.\par \par Family history\par Denies history of premature coronary disease or sudden cardiac death\par \par Social history\par No alcohol use, tobacco use or illicit drug use\par \par Past medical history/surgical history\par GERD\par \par Review of systems \par 14 point review of systems otherwise unremarkable separate described above in history of present illness\par \par Physical examination\par No apparent distress, alert and oriented x3, appropriate affect\par JVD not elevated, supple, no lymphadenopathy \par Clear to auscultation bilaterally with no wheezing rhonchi crackles \par Regular rate and rhythm with no murmur rub or gallop \par Positive bowel sounds, soft, nontender, nondistended, mass and guarding \par No clubbing cyanosis or edema\par Moving all extremities spontaneously\par 2+ DP/PT pulses\par No focal deficits\par \par Assessment/Plan\par --The patient is a 61-year-old postmenopausal woman who is obese.  She does not exercise on a regular basis.  \par -- Reviewed with the patient and her family findings from coronary CTA/FFR (4/22/2022) and TTE (4/13/2022) was reviewed with the patient and daughter.  The patient has no evidence of coronary artery stenosis or plaque by coronary CT angiography.  The calculated Agatston score 0.  TTE demonstrated left ventricular cavity is normal in size with normal left ventricular wall thickness.  EF 73%.  Grade 1 diastolic dysfunction.  Mild tricuspid valve regurgitation with trace pulmonic valve regurgitation.\par --Discussed lipid profile.  Patient will be started on atorvastatin 10mg daily.  Plan to check CK/LFTs in 3 months.\par --The patient is scheduled to be seen by a GI specialist next week.  Discussed with the patient what is GERD.  The associated signs and symptoms and lifestyle steps that can be taken to decrease symptoms was gone over.  Discussed dietary changes with the patient.\par --Labs from 3/24/2022 was reviewed with the patient and her daughter.\par --Recent ER visits and laboratory work was gone over with the patient.\par --EKG was performed due to history of chest pain.\par --It was reviewed in detail with the patient the importance of following a heart healthy lifestyle. AHA/ACC guidelines stress importance of lifestyle modification to lower cardiovascular disease risk. This includes eating a heart healthy diet (fresh fruits/vegetables, whole grains, limit intake of sweets, sugar–sweetened beverages, red meat), regular aerobic exercise (30 minutes of moderate intensity exercise at least 4 times a week), maintenance of desirable body weight and avoidance of tobacco products.\par \par All questions and concerns of the patient and her daughter were addressed.\par

## 2022-07-25 ENCOUNTER — RX RENEWAL (OUTPATIENT)
Age: 62
End: 2022-07-25

## 2022-07-28 ENCOUNTER — APPOINTMENT (OUTPATIENT)
Dept: CARDIOLOGY | Facility: CLINIC | Age: 62
End: 2022-07-28

## 2022-07-28 NOTE — HISTORY OF PRESENT ILLNESS
[FreeTextEntry1] : History of Present Illness\par 61-year-old male with no significant past medical history recently presented to outside hospital emergency department complaining of chest pain that was nonexertional, midsternal and burning in nature.  It radiates to the left side and was associated with nausea and headache for the last 2 weeks.  The patient had similar symptoms 5 years ago.  At that time she was admitted with a negative cardiac work-up.  Negative stress echo.  She was sent home on Protonix.\par \par The patients episode was provoked by dumplings after she developed high blood pressure and chest discomfort.  it was associated with head and neck discomfort.  When she eats the patient still experiences chest discomfort.  It is midepigastric in nature.  Also occurs when excited but not upon activity or lifting.  Not Relieved when she takes a PPI.  The patient does not exercise.  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  She has never had these episodes before.  In 2017 when to [facility] with chest pain during which time she was evaluated with negative workup.\par \par =======\par 4/28/2022\par No cardiopulmonary complaints at rest or upon exertion.  She is walking without any symptoms.  Reports walking a lot at work and at home.  No blood in stool or urine.   Patient is taking all of her medications as prescribed.\par \par ================\par 7/28/2022\par \par \par Family history\par Denies history of premature coronary disease or sudden cardiac death\par \par Social history\par No alcohol use, tobacco use or illicit drug use\par \par Past medical history/surgical history\par GERD\par \par Review of systems \par 14 point review of systems otherwise unremarkable separate described above in history of present illness\par \par Physical examination\par No apparent distress, alert and oriented x3, appropriate affect\par JVD not elevated, supple, no lymphadenopathy \par Clear to auscultation bilaterally with no wheezing rhonchi crackles \par Regular rate and rhythm with no murmur rub or gallop \par Positive bowel sounds, soft, nontender, nondistended, mass and guarding \par No clubbing cyanosis or edema\par Moving all extremities spontaneously\par 2+ DP/PT pulses\par No focal deficits\par \par Assessment/Plan\par --The patient is a 61-year-old postmenopausal woman who is obese.  She does not exercise on a regular basis.  \par -- Reviewed with the patient and her family findings from coronary CTA/FFR (4/22/2022) and TTE (4/13/2022) was reviewed with the patient and daughter.  The patient has no evidence of coronary artery stenosis or plaque by coronary CT angiography.  The calculated Agatston score 0.  TTE demonstrated left ventricular cavity is normal in size with normal left ventricular wall thickness.  EF 73%.  Grade 1 diastolic dysfunction.  Mild tricuspid valve regurgitation with trace pulmonic valve regurgitation.\par --Discussed lipid profile.  Continue atorvastatin 10mg daily.  Plan to check CK/LFTs in 3 months.\par --The patient is scheduled to be seen by a GI specialist next week.  Discussed with the patient what is GERD.  The associated signs and symptoms and lifestyle steps that can be taken to decrease symptoms was gone over.  Discussed dietary changes with the patient.\par --Labs from 3/24/2022 was reviewed with the patient and her daughter.\par --Recent ER visits and laboratory work was gone over with the patient.\par --EKG was performed due to history of chest pain.\par --It was reviewed in detail with the patient the importance of following a heart healthy lifestyle. AHA/ACC guidelines stress importance of lifestyle modification to lower cardiovascular disease risk. This includes eating a heart healthy diet (fresh fruits/vegetables, whole grains, limit intake of sweets, sugar–sweetened beverages, red meat), regular aerobic exercise (30 minutes of moderate intensity exercise at least 4 times a week), maintenance of desirable body weight and avoidance of tobacco products.\par \par All questions and concerns of the patient and her daughter were addressed.\par

## 2022-10-28 ENCOUNTER — RX RENEWAL (OUTPATIENT)
Age: 62
End: 2022-10-28

## 2022-10-28 RX ORDER — PANTOPRAZOLE 40 MG/1
40 TABLET, DELAYED RELEASE ORAL TWICE DAILY
Qty: 60 | Refills: 2 | Status: ACTIVE | COMMUNITY
Start: 2022-03-31 | End: 1900-01-01

## 2023-04-25 NOTE — ED PROVIDER NOTE - PSH
No significant past surgical history Cheek Interpolation Flap Text: A decision was made to reconstruct the defect utilizing an interpolation axial flap and a staged reconstruction.  A telfa template was made of the defect.  This telfa template was then used to outline the Cheek Interpolation flap.  The donor area for the pedicle flap was then injected with anesthesia.  The flap was excised through the skin and subcutaneous tissue down to the layer of the underlying musculature.  The interpolation flap was carefully excised within this deep plane to maintain its blood supply.  The edges of the donor site were undermined.   The donor site was closed in a primary fashion.  The pedicle was then rotated into position and sutured.  Once the tube was sutured into place, adequate blood supply was confirmed with blanching and refill.  The pedicle was then wrapped with xeroform gauze and dressed appropriately with a telfa and gauze bandage to ensure continued blood supply and protect the attached pedicle.

## 2023-06-22 ENCOUNTER — EMERGENCY (EMERGENCY)
Facility: HOSPITAL | Age: 63
LOS: 1 days | Discharge: HOME | End: 2023-06-22
Admitting: STUDENT IN AN ORGANIZED HEALTH CARE EDUCATION/TRAINING PROGRAM
Payer: COMMERCIAL

## 2023-06-22 VITALS
HEART RATE: 91 BPM | TEMPERATURE: 98 F | OXYGEN SATURATION: 100 % | SYSTOLIC BLOOD PRESSURE: 147 MMHG | DIASTOLIC BLOOD PRESSURE: 82 MMHG | RESPIRATION RATE: 18 BRPM

## 2023-06-22 LAB
ANION GAP SERPL CALC-SCNC: 13 MMOL/L — SIGNIFICANT CHANGE UP (ref 7–14)
AST SERPL-CCNC: 26 U/L — SIGNIFICANT CHANGE UP (ref 4–32)
BASOPHILS # BLD AUTO: 0.07 K/UL — SIGNIFICANT CHANGE UP (ref 0–0.2)
BASOPHILS NFR BLD AUTO: 0.7 % — SIGNIFICANT CHANGE UP (ref 0–2)
BILIRUB SERPL-MCNC: 0.3 MG/DL — SIGNIFICANT CHANGE UP (ref 0.2–1.2)
BUN SERPL-MCNC: 12 MG/DL — SIGNIFICANT CHANGE UP (ref 7–23)
CALCIUM SERPL-MCNC: 9.2 MG/DL — SIGNIFICANT CHANGE UP (ref 8.4–10.5)
CHLORIDE SERPL-SCNC: 105 MMOL/L — SIGNIFICANT CHANGE UP (ref 98–107)
CO2 SERPL-SCNC: 25 MMOL/L — SIGNIFICANT CHANGE UP (ref 22–31)
EOSINOPHIL # BLD AUTO: 0.26 K/UL — SIGNIFICANT CHANGE UP (ref 0–0.5)
EOSINOPHIL NFR BLD AUTO: 2.6 % — SIGNIFICANT CHANGE UP (ref 0–6)
GLUCOSE SERPL-MCNC: 124 MG/DL — HIGH (ref 70–99)
HCT VFR BLD CALC: 40.1 % — SIGNIFICANT CHANGE UP (ref 34.5–45)
HGB BLD-MCNC: 12.3 G/DL — SIGNIFICANT CHANGE UP (ref 11.5–15.5)
IANC: 5.75 K/UL — SIGNIFICANT CHANGE UP (ref 1.8–7.4)
IMM GRANULOCYTES NFR BLD AUTO: 0.3 % — SIGNIFICANT CHANGE UP (ref 0–0.9)
LYMPHOCYTES # BLD AUTO: 3.26 K/UL — SIGNIFICANT CHANGE UP (ref 1–3.3)
LYMPHOCYTES # BLD AUTO: 32.5 % — SIGNIFICANT CHANGE UP (ref 13–44)
MAGNESIUM SERPL-MCNC: 1.8 MG/DL — SIGNIFICANT CHANGE UP (ref 1.6–2.6)
MCHC RBC-ENTMCNC: 27.4 PG — SIGNIFICANT CHANGE UP (ref 27–34)
MCHC RBC-ENTMCNC: 30.7 GM/DL — LOW (ref 32–36)
MCV RBC AUTO: 89.3 FL — SIGNIFICANT CHANGE UP (ref 80–100)
MONOCYTES # BLD AUTO: 0.67 K/UL — SIGNIFICANT CHANGE UP (ref 0–0.9)
MONOCYTES NFR BLD AUTO: 6.7 % — SIGNIFICANT CHANGE UP (ref 2–14)
NEUTROPHILS # BLD AUTO: 5.75 K/UL — SIGNIFICANT CHANGE UP (ref 1.8–7.4)
NEUTROPHILS NFR BLD AUTO: 57.2 % — SIGNIFICANT CHANGE UP (ref 43–77)
NRBC # BLD: 0 /100 WBCS — SIGNIFICANT CHANGE UP (ref 0–0)
NRBC # FLD: 0 K/UL — SIGNIFICANT CHANGE UP (ref 0–0)
PLATELET # BLD AUTO: 324 K/UL — SIGNIFICANT CHANGE UP (ref 150–400)
POTASSIUM SERPL-MCNC: 3.9 MMOL/L — SIGNIFICANT CHANGE UP (ref 3.5–5.3)
POTASSIUM SERPL-SCNC: 3.9 MMOL/L — SIGNIFICANT CHANGE UP (ref 3.5–5.3)
PROT SERPL-MCNC: 7.3 G/DL — SIGNIFICANT CHANGE UP (ref 6–8.3)
RBC # BLD: 4.49 M/UL — SIGNIFICANT CHANGE UP (ref 3.8–5.2)
RBC # FLD: 14 % — SIGNIFICANT CHANGE UP (ref 10.3–14.5)
SODIUM SERPL-SCNC: 143 MMOL/L — SIGNIFICANT CHANGE UP (ref 135–145)
TROPONIN T, HIGH SENSITIVITY RESULT: <6 NG/L — SIGNIFICANT CHANGE UP
WBC # BLD: 10.04 K/UL — SIGNIFICANT CHANGE UP (ref 3.8–10.5)
WBC # FLD AUTO: 10.04 K/UL — SIGNIFICANT CHANGE UP (ref 3.8–10.5)

## 2023-06-22 PROCEDURE — 71046 X-RAY EXAM CHEST 2 VIEWS: CPT | Mod: 26

## 2023-06-22 PROCEDURE — 99285 EMERGENCY DEPT VISIT HI MDM: CPT

## 2023-06-22 PROCEDURE — 93010 ELECTROCARDIOGRAM REPORT: CPT

## 2023-06-22 RX ORDER — SODIUM CHLORIDE 9 MG/ML
1000 INJECTION INTRAMUSCULAR; INTRAVENOUS; SUBCUTANEOUS ONCE
Refills: 0 | Status: COMPLETED | OUTPATIENT
Start: 2023-06-22 | End: 2023-06-22

## 2023-06-22 RX ORDER — METOCLOPRAMIDE HCL 10 MG
10 TABLET ORAL ONCE
Refills: 0 | Status: COMPLETED | OUTPATIENT
Start: 2023-06-22 | End: 2023-06-22

## 2023-06-22 RX ORDER — LIDOCAINE 4 G/100G
1 CREAM TOPICAL ONCE
Refills: 0 | Status: COMPLETED | OUTPATIENT
Start: 2023-06-22 | End: 2023-06-22

## 2023-06-22 RX ORDER — MECLIZINE HCL 12.5 MG
25 TABLET ORAL ONCE
Refills: 0 | Status: COMPLETED | OUTPATIENT
Start: 2023-06-22 | End: 2023-06-22

## 2023-06-22 RX ADMIN — SODIUM CHLORIDE 1000 MILLILITER(S): 9 INJECTION INTRAMUSCULAR; INTRAVENOUS; SUBCUTANEOUS at 22:22

## 2023-06-22 RX ADMIN — LIDOCAINE 1 PATCH: 4 CREAM TOPICAL at 22:22

## 2023-06-22 RX ADMIN — Medication 10 MILLIGRAM(S): at 22:22

## 2023-06-22 RX ADMIN — Medication 25 MILLIGRAM(S): at 22:22

## 2023-06-22 NOTE — ED PROVIDER NOTE - NS ED ROS FT
Constitutional: (-) fever   Head: Normal cephalic, Atraumatic  Eyes/ENT: (-) sore throat  Cardiovascular: (-) chest pain, (-) wheezing  Respiratory: (-) cough, (-) shortness of breath  Gastrointestinal: (-) vomiting, (-) diarrhea, (-) abdominal pain  : (-) dysuria   Musculoskeletal: (-) back pain  Integumentary: (-) rash, (-) edema  Neurological: (-)loc (+) dizziness  Allergic/Immunologic: (-) pruritus

## 2023-06-22 NOTE — ED PROVIDER NOTE - CLINICAL SUMMARY MEDICAL DECISION MAKING FREE TEXT BOX
62 Y/O F w/ no PMH presents to ER for dizziness.  Low suspicion for ACS  Likely Vertigo. Labs, IVF, symptom management. Re-evaluate

## 2023-06-22 NOTE — ED PROVIDER NOTE - NSFOLLOWUPCLINICS_GEN_ALL_ED_FT
[facility]NT  ENT  [address], [address]  [place] [address]  Phone: [phone]  Fax:   Follow Up Time: Routine

## 2023-06-22 NOTE — ED ADULT NURSE NOTE - OBJECTIVE STATEMENT
Patient received in intake. A&O4. AMbulatory. Came into ED complaining of dizziness since waking up. States she has been experiencing nausea with no vomiting. Patient also endorses Left sided shoulder pain. Was seen at urgent care today and sent into ED for "ACS workup". Denies chest pain, SOB, Vomiting, headache. Respirations even and unlabored. No signs of acute distress noted. 20g IV placed left AC. Labs drawn and sent. Medicated per MD orders. Comfort and safety maintained.

## 2023-06-22 NOTE — ED ADULT NURSE NOTE - NSFALLUNIVINTERV_ED_ALL_ED
Bed/Stretcher in lowest position, wheels locked, appropriate side rails in place/Call bell, personal items and telephone in reach/Instruct patient to call for assistance before getting out of bed/chair/stretcher/Non-slip footwear applied when patient is off stretcher/[place] to call system/Physically safe environment - no spills, clutter or unnecessary equipment/Purposeful proactive rounding/Room/bathroom lighting operational, light cord in reach

## 2023-06-22 NOTE — ED ADULT TRIAGE NOTE - CHIEF COMPLAINT QUOTE
c/o dizziness, nauseous and left lower shoulder x1day. sent to Ed from Urgent care for "ACS workup" . no PMhx

## 2023-06-22 NOTE — ED PROVIDER NOTE - PATIENT PORTAL LINK FT
You can access the FollowMyHealth Patient Portal offered by [facility] by registering at the following website: [email or web address]. By joining [facility]’s FollowMyHealth portal, you will also be able to view your health information using other applications (apps) compatible with our system.

## 2023-06-22 NOTE — ED PROVIDER NOTE - PHYSICAL EXAMINATION
Vital signs reviewed.   CONSTITUTIONAL: Well-appearing; well-nourished; in no apparent distress. Non-toxic appearing.   HEAD: Normocephalic, atraumatic.  EYES: PERRL, EOM intact, normal conjunctiva and no sclera injection noted  ENT: normal nose; no rhinorrhea  NECK: FAROM. No midline tenderness.  CARD: Normal S1, S2  RESP: Normal chest excursion with respiration; breath sounds clear and equal bilaterally  ABD/GI: soft, non-distended; non-tender; no palpable organomegaly, no pulsatile mass.  EXT/MS: moves all extremities; distal pulses are normal, no pedal edema.  SKIN: Normal for age and race; warm; dry; good turgor; no apparent lesions or exudate noted.  NEURO: Awake, alert, oriented x 3  PSYCH: Normal mood; appropriate affect.

## 2023-06-22 NOTE — ED PROVIDER NOTE - OBJECTIVE STATEMENT
62 Y/O F w/ no PMH presents to ER for dizziness. Admits to feeling off balance and room spinning sensation since getting out of bed this morning. Worse w/ transitioning from sitting to standing, movement and ambulating. Improves w/ rest. Similar symptoms some 20 years ago w/ ear infection. Also has pain to LT shoulder. Seen by urgent care had EKG and sent to ED for ACS management. No hx of CAD/MI. No FH. Not a smoker. Does not endorse chest pain. Denies fever, chills, nausea/vomiting, headache, shortness of breath, palpitations, syncope

## 2023-06-23 VITALS
DIASTOLIC BLOOD PRESSURE: 78 MMHG | OXYGEN SATURATION: 100 % | SYSTOLIC BLOOD PRESSURE: 134 MMHG | TEMPERATURE: 98 F | HEART RATE: 79 BPM | RESPIRATION RATE: 17 BRPM

## 2023-06-23 LAB
ALBUMIN SERPL ELPH-MCNC: 4.1 G/DL — SIGNIFICANT CHANGE UP (ref 3.3–5)
ALP SERPL-CCNC: 87 U/L — SIGNIFICANT CHANGE UP (ref 40–120)
ALT FLD-CCNC: 25 U/L — SIGNIFICANT CHANGE UP (ref 4–33)
CREAT SERPL-MCNC: 0.63 MG/DL — SIGNIFICANT CHANGE UP (ref 0.5–1.3)
EGFR: 98 ML/MIN/1.73M2 — SIGNIFICANT CHANGE UP

## 2023-06-23 RX ORDER — MECLIZINE HCL 12.5 MG
1 TABLET ORAL
Qty: 21 | Refills: 0
Start: 2023-06-23 | End: 2023-06-29

## 2023-10-05 NOTE — H&P ADULT. - RS GEN HX ROS MEA POS PC
cough/dyspnea Zygomaticofacial Flap Text: Given the location of the defect, shape of the defect and the proximity to free margins a zygomaticofacial flap was deemed most appropriate for repair. Using a sterile surgical marker, the appropriate flap was drawn incorporating the defect and placing the expected incisions within the relaxed skin tension lines where possible. The area thus outlined was incised deep to adipose tissue with a #15 scalpel blade with preservation of a vascular pedicle.  The skin margins were undermined to an appropriate distance in all directions utilizing iris scissors. The flap was then carried over into the defect and anchored with interrupted buried subcutaneous sutures.

## 2024-03-27 NOTE — PATIENT PROFILE ADULT. - TEACHING/LEARNING LEARNING PREFERENCES
ice to face area 20 minutes on and 20 minutes off for several days. velcore strips  can be removed 3/28/24. no wisdom teeth were removed
skill demonstration/verbal instruction
